# Patient Record
Sex: FEMALE
[De-identification: names, ages, dates, MRNs, and addresses within clinical notes are randomized per-mention and may not be internally consistent; named-entity substitution may affect disease eponyms.]

---

## 2023-05-31 ENCOUNTER — NURSE TRIAGE (OUTPATIENT)
Dept: OTHER | Facility: CLINIC | Age: 23
End: 2023-05-31

## 2023-06-01 NOTE — TELEPHONE ENCOUNTER
Location of patient: [place]    Subjective: Caller states \"I am out of my anxiety medication\"     Current Symptoms: Anxiety is very high at time of call and somewhat tearful. Out of her Quetapine 25 mg, BID as needed for the past 3 days. Unable to eat. Nausea. Anorexic. Just lost custody of her child and quit her job. Has custody of her 12year old sister. Thoughts of suicide, no attempts or plans. Skin feels tingling and she is having a burst of energy as she wants to go run. Associated Symptoms: reduced activity    Recommended disposition: Go to ED Now    Care advice provided, patient verbalizes understanding; denies any other questions or concerns. Outcome: Caller agrees to go to ER for evaluation. Going to ER at [facility]     This triage is a result of a call to the [facility]    Reason for Disposition   Suicide thoughts, threats, attempts, or questions   [1] Depression symptoms (sadness, hopelessness, decreased energy) AND [2] unable to do any normal activities (e.g., self care, school, work; in comparison to baseline). Protocols used:  Anxiety and Panic Attack-ADULT-, Suicide Concerns-ADULT-